# Patient Record
Sex: FEMALE | Race: WHITE | Employment: UNEMPLOYED | URBAN - METROPOLITAN AREA
[De-identification: names, ages, dates, MRNs, and addresses within clinical notes are randomized per-mention and may not be internally consistent; named-entity substitution may affect disease eponyms.]

---

## 2018-05-24 ENCOUNTER — OP HISTORICAL/CONVERTED ENCOUNTER (OUTPATIENT)
Dept: OTHER | Age: 48
End: 2018-05-24

## 2019-07-16 ENCOUNTER — OP HISTORICAL/CONVERTED ENCOUNTER (OUTPATIENT)
Dept: OTHER | Age: 49
End: 2019-07-16

## 2019-07-25 ENCOUNTER — OP HISTORICAL/CONVERTED ENCOUNTER (OUTPATIENT)
Dept: OTHER | Age: 49
End: 2019-07-25

## 2021-06-15 ENCOUNTER — OFFICE VISIT (OUTPATIENT)
Dept: OBGYN CLINIC | Age: 51
End: 2021-06-15
Payer: COMMERCIAL

## 2021-06-15 VITALS
OXYGEN SATURATION: 93 % | BODY MASS INDEX: 30.39 KG/M2 | WEIGHT: 178 LBS | HEIGHT: 64 IN | SYSTOLIC BLOOD PRESSURE: 142 MMHG | DIASTOLIC BLOOD PRESSURE: 98 MMHG | HEART RATE: 92 BPM | RESPIRATION RATE: 16 BRPM

## 2021-06-15 DIAGNOSIS — Z01.419 GYNECOLOGIC EXAM NORMAL: Primary | ICD-10-CM

## 2021-06-15 DIAGNOSIS — Z90.710 SCREENING FOR MALIGNANT NEOPLASM OF VAGINA AFTER TOTAL HYSTERECTOMY: ICD-10-CM

## 2021-06-15 DIAGNOSIS — Z11.3 ROUTINE SCREENING FOR STI (SEXUALLY TRANSMITTED INFECTION): ICD-10-CM

## 2021-06-15 DIAGNOSIS — Z12.72 SCREENING FOR MALIGNANT NEOPLASM OF VAGINA AFTER TOTAL HYSTERECTOMY: ICD-10-CM

## 2021-06-15 PROCEDURE — 99396 PREV VISIT EST AGE 40-64: CPT | Performed by: OBSTETRICS & GYNECOLOGY

## 2021-06-15 NOTE — PATIENT INSTRUCTIONS

## 2021-06-17 NOTE — PROGRESS NOTES
Dimitri Lockhart is a 45 y.o. female, , No LMP recorded. Patient has had a hysterectomy. , who presents today for the following:  Chief Complaint   Patient presents with    Annual Exam     Pt wants std testing. No Known Allergies    No current outpatient medications on file. No current facility-administered medications for this visit. History reviewed. No pertinent past medical history. Past Surgical History:   Procedure Laterality Date    HX BLADDER SUSPENSION         History reviewed. No pertinent family history. Social History     Socioeconomic History    Marital status:      Spouse name: Not on file    Number of children: Not on file    Years of education: Not on file    Highest education level: Not on file   Occupational History    Not on file   Tobacco Use    Smoking status: Current Every Day Smoker     Packs/day: 1.00    Smokeless tobacco: Never Used   Substance and Sexual Activity    Alcohol use: Yes    Drug use: Never    Sexual activity: Yes     Partners: Male     Birth control/protection: Surgical   Other Topics Concern    Not on file   Social History Narrative    Not on file     Social Determinants of Health     Financial Resource Strain:     Difficulty of Paying Living Expenses:    Food Insecurity:     Worried About Running Out of Food in the Last Year:     [address] in the Last Year:    Transportation Needs:     Lack of Transportation (Medical):      Lack of Transportation (Non-Medical):    Physical Activity:     Days of Exercise per Week:     Minutes of Exercise per Session:    Stress:     Feeling of Stress :    Social Connections:     Frequency of Communication with Friends and Family:     Frequency of Social Gatherings with Friends and Family:     Attends [employer] Services:     Active Member of Clubs or Organizations:     Attends Club or Organization Meetings:     Marital Status:    Intimate Partner Violence:     Fear of Current or Ex-Partner:     Emotionally Abused:     Physically Abused:     Sexually Abused:          HPI  Annual exam    Review of Systems   Constitutional: Negative. Respiratory: Negative. Cardiovascular: Negative. Gastrointestinal: Negative. Genitourinary: Negative. Musculoskeletal: Negative. Skin: Negative. Neurological: Negative. Endo/Heme/Allergies: Negative. Psychiatric/Behavioral: Negative. All other systems reviewed and are negative. BP (!) 142/98 (BP 1 Location: Right arm, BP Patient Position: Sitting)   Pulse 92   Resp 16   Ht 5' 4\" (1.626 m)   Wt 178 lb (80.7 kg)   SpO2 93%   BMI 30.55 kg/m²    OBGyn Exam   Constitutional:     General Appearance: healthy-appearing, well-nourished, and well-developed; Level of Distress: NAD. Ambulation: ambulating normally. Psychiatric:   Insight: good judgement. Mental Status: normal mood and affect and active and alert. Orientation: to time, place, and person. Memory: recent memory normal and remote memory normal.     Head: Head: normocephalic and atraumatic. Neck:   Neck: supple, FROM, trachea midline, and no masses. Lymph Nodes: no cervical LAD, supraclavicular LAD, axillary LAD, or inguinal LAD. Thyroid: no enlargement or nodules and non-tender. Lungs:   Respiratory effort: no dyspnea. Cardiovascular:     Pulses including femoral / pedal: normal throughout. Breast: Breast: no masses or abnormal secretions and normal appearance. Abdomen:    no tenderness, guarding, masses, rebound tenderness, or CVA tenderness and non-distended. Female :   External genitalia: no lesions or rash and normal.   Vagina: moist mucosa;    Cervix: absent   Uterus: absent  Adnexae: no adnexal mass or tenderness and size WNL. Bladder and Urethra: normal bladder and urethra (except where noted).        Skin:   Inspection and palpation: no rash, lesions, ulcer, induration, nodules, jaundice, or abnormal nevi and good turgor. Nails: normal.     Back: Thoracolumbar Appearance: normal curvature. 1. Gynecologic exam normal    - CBC WITH AUTOMATED DIFF; Future  - TSH AND FREE T4  - PAP IG, CT-NG-TV, APTIMA HPV AND RFX 16/18,45([id number])  - FSH AND LH  - PROGESTERONE  - ESTRADIOL, SERUM    2. Screening for malignant neoplasm of vagina after total hysterectomy      3.  Routine screening for STI (sexually transmitted infection)    - HSV1/HSV2(IGG/M)        Follow-up and Dispositions    · Return in about 1 year (around 6/15/2022) for annual.

## 2021-06-18 LAB
C TRACH RRNA CVX QL NAA+PROBE: NEGATIVE
CYTOLOGIST CVX/VAG CYTO: NORMAL
CYTOLOGY CVX/VAG DOC CYTO: NORMAL
CYTOLOGY CVX/VAG DOC THIN PREP: NORMAL
DX ICD CODE: NORMAL
HPV I/H RISK 4 DNA CVX QL PROBE+SIG AMP: NEGATIVE
Lab: NORMAL
N GONORRHOEA RRNA CVX QL NAA+PROBE: NEGATIVE
OTHER STN SPEC: NORMAL
STAT OF ADQ CVX/VAG CYTO-IMP: NORMAL
T VAGINALIS RRNA SPEC QL NAA+PROBE: NEGATIVE

## 2021-06-20 LAB
ESTRADIOL SERPL HS-MCNC: 41 PG/ML
FSH SERPL-ACNC: 24.3 MIU/ML
HSV1 IGG SER IA-ACNC: <0.91 INDEX (ref 0–0.9)
HSV1 IGM TITR SER IF: ABNORMAL TITER
HSV2 IGG SER IA-ACNC: 9.34 INDEX (ref 0–0.9)
HSV2 IGM TITR SER IF: ABNORMAL TITER
LH SERPL-ACNC: 9.1 MIU/ML
PROGEST SERPL-MCNC: 0.1 NG/ML

## 2021-07-02 ENCOUNTER — HOSPITAL ENCOUNTER (OUTPATIENT)
Dept: MAMMOGRAPHY | Age: 51
Discharge: HOME | End: 2021-07-02
Attending: OBSTETRICS & GYNECOLOGY
Payer: COMMERCIAL

## 2021-07-02 DIAGNOSIS — R92.8 ABNORMAL MAMMOGRAM: ICD-10-CM

## 2021-07-02 PROCEDURE — 77061 BREAST TOMOSYNTHESIS UNI: CPT

## 2021-12-13 ENCOUNTER — OFFICE VISIT (OUTPATIENT)
Dept: FAMILY MEDICINE CLINIC | Age: 51
End: 2021-12-13
Payer: COMMERCIAL

## 2021-12-13 VITALS
HEIGHT: 64 IN | WEIGHT: 183.6 LBS | DIASTOLIC BLOOD PRESSURE: 81 MMHG | BODY MASS INDEX: 31.34 KG/M2 | OXYGEN SATURATION: 98 % | TEMPERATURE: 97.3 F | SYSTOLIC BLOOD PRESSURE: 116 MMHG | HEART RATE: 96 BPM

## 2021-12-13 DIAGNOSIS — Z00.00 ENCOUNTER FOR ROUTINE ADULT HEALTH EXAMINATION WITHOUT ABNORMAL FINDINGS: Primary | ICD-10-CM

## 2021-12-13 DIAGNOSIS — Z11.59 NEED FOR HEPATITIS B SCREENING TEST: ICD-10-CM

## 2021-12-13 DIAGNOSIS — E66.3 OVERWEIGHT: ICD-10-CM

## 2021-12-13 DIAGNOSIS — Z11.59 ENCOUNTER FOR HEPATITIS C SCREENING TEST FOR LOW RISK PATIENT: ICD-10-CM

## 2021-12-13 PROCEDURE — 99386 PREV VISIT NEW AGE 40-64: CPT | Performed by: FAMILY MEDICINE

## 2021-12-13 NOTE — PROGRESS NOTES
IDENTIFYING INFORMATION:      Anais Kiran , 45 y.o., female  [facility]     Medical Record Number: [id number]    E and M CODING:  NEW PATIENT      There is no problem list on file for this patient. CHIEF COMPLAINT:   Chief Complaint   Patient presents with    New Patient     wants a physical.  States that she hasnt had one in years.  Hip Pain     c/o right hip pain         HISTORY OF PRESENT ILLNESS:    Rossy Rutherford is a 45 y.o. female . she comes in for New patient and says she wants a physical, has a gynecologist and PAP, mammo up to date and reviewed those records in chart. Had screening colonoscopy age 47 and due in . Says her ex  , liver caner and she wants checked. NO history or reported hepatitis and she has not checked. Also has some right hip pain, chronically and some with walking, usually when lying and a nagging pain when walking. Annoying but not severe, doesn't take medications. Does not give out, click or catch. Sitting a lot , works from home. Not as bad during those times. Occasionally gets vertigo, randomly. Not associated with anything. Will take a dramamine and lie down and goes away. Had it severely years ago. Left side and at night, lying. Had it worked up she says and now it is infrequent. Perhaps with change in weather, rainy . Does wear a mouthguard at night, clinches teeth and diagnosed with TMJ. PAST MEDICAL HISTORY:     History reviewed. No pertinent past medical history. MEDICATIONS:     No current outpatient medications on file prior to visit. No current facility-administered medications on file prior to visit.        ALLERGIES:    No Known Allergies      SOCIAL HISTORY:     Social History     Socioeconomic History    Marital status:    Tobacco Use    Smoking status: Current Every Day Smoker     Packs/day: 0.50     Types: Cigarettes    Smokeless tobacco: Never Used   Vaping Use    Vaping Use: Never used   Substance and Sexual Activity    Alcohol use: Yes     Comment: rarely    Drug use: Never    Sexual activity: Yes     Partners: Male     Birth control/protection: Surgical         SURGICAL HISTORY:    Past Surgical History:   Procedure Laterality Date    HX BLADDER SUSPENSION      HX COLONOSCOPY  2019    HX PARTIAL HYSTERECTOMY          FAMILY HISTORY:    Family History   Problem Relation Age of Onset    Cancer Mother         ureter cancer    Cancer Father         hepatoma    Cancer Maternal Aunt         breast         REVIEW OF SYSTEMS:    I personally collected this information from all available source present (patient/others in room and records available) -[email or web address]  Review of Systems   Constitutional: Negative for chills, diaphoresis and fever. HENT: Negative for congestion, hearing loss, sore throat and tinnitus. Eyes: Negative for blurred vision, double vision and photophobia. Respiratory: Negative for cough, sputum production, shortness of breath and wheezing. Cardiovascular: Negative for chest pain, palpitations, orthopnea, leg swelling and PND. Gastrointestinal: Positive for heartburn (occasionally). Negative for abdominal pain, blood in stool, constipation, diarrhea, nausea and vomiting. Genitourinary: Negative for dysuria, frequency and urgency. Musculoskeletal: Positive for joint pain. Negative for back pain, myalgias and neck pain. Skin: Negative for itching. Neurological: Positive for dizziness. Negative for tingling, sensory change, weakness and headaches. Endo/Heme/Allergies: Does not bruise/bleed easily. Psychiatric/Behavioral: Negative for depression. The patient is not nervous/anxious and does not have insomnia.           PHYSICAL EXAMINATION:    Vital Signs:    Visit Vitals  /81 (BP 1 Location: Right upper arm, BP Patient Position: Sitting)   Pulse 96   Temp 97.3 °F (36.3 °C) (Temporal)   Ht 5' 4\" (1.626 m)   Wt 183 lb 9.6 oz (83.3 kg) SpO2 98%   BMI 31.51 kg/m²         Wt Readings from Last 3 Encounters:   12/13/21 183 lb 9.6 oz (83.3 kg)   06/15/21 178 lb (80.7 kg)     BP Readings from Last 3 Encounters:   12/13/21 116/81   06/15/21 (!) 142/98         Physical Exam  Nursing note reviewed. Constitutional:       General: She is not in acute distress. Appearance: Normal appearance. She is obese. She is not ill-appearing or toxic-appearing. HENT:      Right Ear: Tympanic membrane, ear canal and external ear normal.      Left Ear: Tympanic membrane, ear canal and external ear normal.      Nose: No congestion or rhinorrhea. Mouth/Throat:      Pharynx: No oropharyngeal exudate or posterior oropharyngeal erythema. Eyes:      General: No scleral icterus. Extraocular Movements: Extraocular movements intact. Conjunctiva/sclera: Conjunctivae normal.      Pupils: Pupils are equal, round, and reactive to light. Neck:      Vascular: No carotid bruit. Cardiovascular:      Rate and Rhythm: Normal rate and regular rhythm. Heart sounds: No murmur heard. No friction rub. No gallop. Pulmonary:      Effort: Pulmonary effort is normal.      Breath sounds: Normal breath sounds. No wheezing, rhonchi or rales. Abdominal:      General: Bowel sounds are normal. There is no distension. Palpations: Abdomen is soft. There is no mass. Tenderness: There is no abdominal tenderness. Musculoskeletal:         General: Tenderness (right SI, normal ROM hip and NO trchanter tenderness ) present. No swelling or deformity. Cervical back: No rigidity. No muscular tenderness. Right lower leg: No edema. Left lower leg: No edema. Lymphadenopathy:      Cervical: No cervical adenopathy. Skin:     Coloration: Skin is not jaundiced. Findings: No erythema or rash. Neurological:      General: No focal deficit present. Mental Status: She is alert and oriented to person, place, and time.  Mental status is at baseline. Gait: Gait normal.   Psychiatric:         Mood and Affect: Mood normal.         Behavior: Behavior normal.         Thought Content: Thought content normal.         Judgment: Judgment normal.           ASSESSMENT/PLAN:     New patient visit today  The patient and I discussed all age and gender appropriate screening exams. Risk of non compliance discussed including missing early, treatable and possibly curable serious health issues. Labs, imaging and vaccinations as well as any other pertinent testing was ordered if appropriate.  Due to ex spouse with liver cancer of undetermined etiology check LFts and Hep B and C , though she is otherwise low risk with no tattoos, blood transfusions .  Follow up and treat as indicated. ICD-10-CM ICD-9-CM    1. Encounter for routine adult health examination without abnormal findings  T55.08 Z92.8 METABOLIC PANEL, COMPREHENSIVE      CBC WITH AUTOMATED DIFF      LIPID PANEL      URINALYSIS W/ RFLX MICROSCOPIC   2. Need for hepatitis B screening test  Z11.59 V73.89 HBV CORE AB, IGG/IGM   3. Encounter for hepatitis C screening test for low risk patient  Z11.59 V73.89 HEPATITIS C QT BY PCR WITH REFLEX GENOTYPE   4. Overweight  E66.3 278.02 TSH 3RD GENERATION     Discussion (regarding today's visit with Golden Coleman);   WE gave the patient a review of medications, treatment, testing such as labs, imagine, referrals and when to call regarding results and appointments.  Reminded patient to keep any and all appointments with specialists, labs, imaging.  Reminded patient to make sure we get copies of any specialists care, labs and imaging.  Reminded patient to call of come by the office if there are any concerns, questions , comments or problems.  The patient verbalized understanding of the care plan and all questions were answered to the patient's satisfaction prior to leaving the office.     The patient was told that failure to comply with recommended testing could result in abnormal health consequences.  The patient was instructed to have yearly routine health maintenance including but not limited to age appropriate vaccines, testing, screening exams.  ALL questions were answered to her satisfaction before leaving the office. The patient actively participated in medical decision making. This date I spent  20 minutes reviewing chart, previous notes, tests and interviewing and examining patients answering questions providing follow up as well as ordering tests. FOLLOW UP:     Patient knows to keep any and all future visits scheduled unless told otherwise. Patient knows to call, come back if any concerns, questions, comments or problems arise. Follow-up and Dispositions    · Return in about 1 year (around 12/13/2022) for follow up yearly. Signed By: Nilson Reese DO     December 13, 2021     TIME: 6:06 pm    This visit was reviewed and signed electronically. It was been completed with voice recognition software and hand typing. It may have syntax and spelling errors despite editing.

## 2021-12-13 NOTE — PATIENT INSTRUCTIONS
General Health and concerns:  HEART HEALTHY DIET:  A heart healthy diet is one that is low in cholesterol (less than 300 mg daily), fat (less than 80 g daily) . You should also minimize carbohydrates / sugars (less amounts of breads, pastas, potato and potato products and sugary foods/snacks, cookies, cakes, etc) . Try to eat whole wheat/multigrain breads and pastas and eat more vegetables. Cook with olive oil (or no oil) and grill, bake, broil or boil foods. Less red meat and more chicken , fish and lean cuts of beef (limited). 2188-9205 calories per day is sufficient [id number] is acceptable for weight loss. EXERCISE:  You should do exercise 3-5 days per week (minimum) to include increasing your heart rate for 30 to 45 minutes. At least a pace of a brisk walk should do that. This build up your heart and lung endurance and muscles and helps many function of the body. OTHER:    IF your condition(s) do not improve, get worse and/or if any concerns arise, please call or come by the office. Routine Health maintenance: You need to get a yearly follow up/physical exam to review, discuss age and gender appropriate exams, labs, vaccines and screening tests. This includes cardiovascular health risk, cancer screens and other siomara related topics. Medications-Take all medications as directed. Please do not stop unless you talk to your doctor or health care provider first. Report any problems immediately. PLEASE CHECK THE LIST FROM TODAY's VISIT and make SURE IT IS ACCURATE-Please bring any issues to our concern IMMEDIATELY!! Referrals: if you have been given a referral, please call the office if you do not hear from provider in one week. You may make the appointment yourself. Please keep all appointments with specialists and ask them to send their notes, thoughts, recommendations to us , as your PCP.     KEEP all upcoming appointments with our office UNLESS otherwise and specifically told not to. CHECK your diagnosis/problem list for today and that orders and prescriptions are what we discussed as well as MAKE sure all information is accurate and has been discussed to your satisfaction. PLEASE make sure all your questions have been answered and feel free to call or come back should any concerns arise. Imaging/Labs:  Be sure to get these images in a timely manner. IF your test must be scheduled, let us know if you need help getting this done and if you do not hear from that provider in a week , call us or them. BE SURE to call the office if you do not hear regarding the results in one week after the test is performed Image or lab). It is our intention to inform you of the results ALWAYS, even if normal you should get a notification (Call, portal message). PLEASE abdirahman if you do not get the results. PLEASE follow all recommendations and call/come in /ask questions if you do not understand of if problems develop after or in between visits. Failure to comply with recommended health care advise could result in serious health consequences. Thank you for choosing our practice and please let us know how we can help you feel better and stay well!

## 2021-12-13 NOTE — PROGRESS NOTES
Chief Complaint   Patient presents with    New Patient     wants a physical.  States that she hasnt had one in years.      Hip Pain     c/o right hip pain         Visit Vitals  /81 (BP 1 Location: Right upper arm, BP Patient Position: Sitting)   Pulse 96   Temp 97.3 °F (36.3 °C) (Temporal)   Ht 5' 4\" (1.626 m)   Wt 183 lb 9.6 oz (83.3 kg)   SpO2 98%   BMI 31.51 kg/m²

## 2021-12-15 LAB
ALBUMIN SERPL-MCNC: 4.6 G/DL (ref 3.8–4.9)
ALBUMIN/GLOB SERPL: 2 {RATIO} (ref 1.2–2.2)
ALP SERPL-CCNC: 83 IU/L (ref 44–121)
ALT SERPL-CCNC: 15 IU/L (ref 0–32)
APPEARANCE UR: CLEAR
AST SERPL-CCNC: 12 IU/L (ref 0–40)
BASOPHILS # BLD AUTO: 0.1 X10E3/UL (ref 0–0.2)
BASOPHILS NFR BLD AUTO: 1 %
BILIRUB SERPL-MCNC: 0.4 MG/DL (ref 0–1.2)
BILIRUB UR QL STRIP: NEGATIVE
BUN SERPL-MCNC: 11 MG/DL (ref 6–24)
BUN/CREAT SERPL: 14 (ref 9–23)
CALCIUM SERPL-MCNC: 9.3 MG/DL (ref 8.7–10.2)
CHLORIDE SERPL-SCNC: 103 MMOL/L (ref 96–106)
CHOLEST SERPL-MCNC: 202 MG/DL (ref 100–199)
CO2 SERPL-SCNC: 22 MMOL/L (ref 20–29)
COLOR UR: YELLOW
CREAT SERPL-MCNC: 0.78 MG/DL (ref 0.57–1)
EOSINOPHIL # BLD AUTO: 0.4 X10E3/UL (ref 0–0.4)
EOSINOPHIL NFR BLD AUTO: 4 %
ERYTHROCYTE [DISTWIDTH] IN BLOOD BY AUTOMATED COUNT: 12.3 % (ref 11.7–15.4)
GLOBULIN SER CALC-MCNC: 2.3 G/DL (ref 1.5–4.5)
GLUCOSE SERPL-MCNC: 96 MG/DL (ref 65–99)
GLUCOSE UR QL: NEGATIVE
HBV CORE AB SERPL QL IA: NEGATIVE
HBV CORE IGM SERPL QL IA: NEGATIVE
HCT VFR BLD AUTO: 44.4 % (ref 34–46.6)
HCV GENOTYPE: NORMAL
HCV RNA SERPL NAA+PROBE-ACNC: NORMAL IU/ML
HCV RNA SERPL NAA+PROBE-LOG IU: NORMAL LOG10 IU/ML
HDLC SERPL-MCNC: 36 MG/DL
HGB BLD-MCNC: 14.7 G/DL (ref 11.1–15.9)
HGB UR QL STRIP: NEGATIVE
IMM GRANULOCYTES # BLD AUTO: 0.1 X10E3/UL (ref 0–0.1)
IMM GRANULOCYTES NFR BLD AUTO: 1 %
KETONES UR QL STRIP: NEGATIVE
LDLC SERPL CALC-MCNC: 130 MG/DL (ref 0–99)
LEUKOCYTE ESTERASE UR QL STRIP: NEGATIVE
LYMPHOCYTES # BLD AUTO: 2.2 X10E3/UL (ref 0.7–3.1)
LYMPHOCYTES NFR BLD AUTO: 28 %
MCH RBC QN AUTO: 28.9 PG (ref 26.6–33)
MCHC RBC AUTO-ENTMCNC: 33.1 G/DL (ref 31.5–35.7)
MCV RBC AUTO: 87 FL (ref 79–97)
MICRO URNS: NORMAL
MONOCYTES # BLD AUTO: 0.5 X10E3/UL (ref 0.1–0.9)
MONOCYTES NFR BLD AUTO: 6 %
NEUTROPHILS # BLD AUTO: 4.9 X10E3/UL (ref 1.4–7)
NEUTROPHILS NFR BLD AUTO: 60 %
NITRITE UR QL STRIP: NEGATIVE
PH UR STRIP: 5.5 [PH] (ref 5–7.5)
PLATELET # BLD AUTO: 286 X10E3/UL (ref 150–450)
POTASSIUM SERPL-SCNC: 4.5 MMOL/L (ref 3.5–5.2)
PROT SERPL-MCNC: 6.9 G/DL (ref 6–8.5)
PROT UR QL STRIP: NEGATIVE
RBC # BLD AUTO: 5.09 X10E6/UL (ref 3.77–5.28)
SODIUM SERPL-SCNC: 138 MMOL/L (ref 134–144)
SP GR UR: 1.02 (ref 1–1.03)
TEST INFORMATION: NORMAL
TRIGL SERPL-MCNC: 201 MG/DL (ref 0–149)
TSH SERPL DL<=0.005 MIU/L-ACNC: 1.69 UIU/ML (ref 0.45–4.5)
UROBILINOGEN UR STRIP-MCNC: 0.2 MG/DL (ref 0.2–1)
VLDLC SERPL CALC-MCNC: 36 MG/DL (ref 5–40)
WBC # BLD AUTO: 8 X10E3/UL (ref 3.4–10.8)

## 2021-12-20 NOTE — PROGRESS NOTES
The hepatitis is negative. Liver, kidneys, sugar normal.  Blood count is normal without anemia or infection. Thyroid is normal.  Urine is normal.  Cholesterol is elevated. The LDL is 130 and should be less than 100. The total is 202 and should be less than 200. I recommend taking red yeast rice 600 mg over-the-counter daily and rechecking in 6 months.

## 2023-03-02 ENCOUNTER — APPOINTMENT (OUTPATIENT)
Dept: CT IMAGING | Age: 53
End: 2023-03-02
Attending: EMERGENCY MEDICINE
Payer: COMMERCIAL

## 2023-03-02 ENCOUNTER — APPOINTMENT (OUTPATIENT)
Dept: GENERAL RADIOLOGY | Age: 53
End: 2023-03-02
Attending: EMERGENCY MEDICINE
Payer: COMMERCIAL

## 2023-03-02 ENCOUNTER — HOSPITAL ENCOUNTER (EMERGENCY)
Age: 53
Discharge: HOME | End: 2023-03-02
Attending: EMERGENCY MEDICINE
Payer: COMMERCIAL

## 2023-03-02 VITALS
TEMPERATURE: 98.2 F | HEIGHT: 64 IN | BODY MASS INDEX: 31.96 KG/M2 | WEIGHT: 187.2 LBS | SYSTOLIC BLOOD PRESSURE: 119 MMHG | DIASTOLIC BLOOD PRESSURE: 81 MMHG | HEART RATE: 93 BPM | RESPIRATION RATE: 14 BRPM | OXYGEN SATURATION: 97 %

## 2023-03-02 DIAGNOSIS — R42 VERTIGO: Primary | ICD-10-CM

## 2023-03-02 DIAGNOSIS — R07.89 ATYPICAL CHEST PAIN: ICD-10-CM

## 2023-03-02 LAB
ALBUMIN SERPL-MCNC: 4.1 G/DL (ref 3.5–5.2)
ALBUMIN/GLOB SERPL: 1.4 (ref 1.1–2.2)
ALP SERPL-CCNC: 95 U/L (ref 35–104)
ALT SERPL-CCNC: 33 U/L (ref 10–35)
ANION GAP SERPL CALC-SCNC: 11 MMOL/L (ref 5–15)
AST SERPL-CCNC: 20 U/L (ref 10–35)
BASOPHILS # BLD: 0.1 K/UL (ref 0–1)
BASOPHILS NFR BLD: 1 % (ref 0–1)
BILIRUB SERPL-MCNC: 0.3 MG/DL (ref 0.2–1)
BNP SERPL-MCNC: 30 PG/ML
BUN SERPL-MCNC: 11 MG/DL (ref 6–20)
BUN/CREAT SERPL: 15 (ref 12–20)
CALCIUM SERPL-MCNC: 9.2 MG/DL (ref 8.6–10)
CHLORIDE SERPL-SCNC: 107 MMOL/L (ref 98–107)
CO2 SERPL-SCNC: 24 MMOL/L (ref 22–29)
COMMENT, HOLDF: NORMAL
CREAT SERPL-MCNC: 0.74 MG/DL (ref 0.5–0.9)
D DIMER PPP FEU-MCNC: 0.28 UG/ML(FEU)
DIFFERENTIAL METHOD BLD: ABNORMAL
EOSINOPHIL # BLD: 0.5 K/UL (ref 0–0.4)
EOSINOPHIL NFR BLD: 6 %
ERYTHROCYTE [DISTWIDTH] IN BLOOD BY AUTOMATED COUNT: 12.7 % (ref 11.5–14.5)
GLOBULIN SER CALC-MCNC: 2.9 G/DL (ref 2–4)
GLUCOSE SERPL-MCNC: 119 MG/DL (ref 65–100)
HCT VFR BLD AUTO: 43.5 % (ref 35–47)
HGB BLD-MCNC: 14.5 G/DL (ref 11.5–16)
IMM GRANULOCYTES # BLD AUTO: 0.1 K/UL (ref 0–0.04)
IMM GRANULOCYTES NFR BLD AUTO: 1 % (ref 0–0.5)
LYMPHOCYTES # BLD: 1.8 K/UL (ref 0.8–3.5)
LYMPHOCYTES NFR BLD: 21 % (ref 12–49)
MAGNESIUM SERPL-MCNC: 1.9 MG/DL (ref 1.6–2.6)
MCH RBC QN AUTO: 28.7 PG (ref 26–34)
MCHC RBC AUTO-ENTMCNC: 33.3 G/DL (ref 30–36.5)
MCV RBC AUTO: 86.1 FL (ref 80–99)
MONOCYTES # BLD: 0.5 K/UL (ref 0–1)
MONOCYTES NFR BLD: 6 % (ref 5–13)
NEUTS SEG # BLD: 5.7 K/UL (ref 1.8–8)
NEUTS SEG NFR BLD: 65 % (ref 32–75)
NRBC # BLD: 0 K/UL (ref 0–0.01)
NRBC BLD-RTO: 0 PER 100 WBC
PHOSPHATE SERPL-MCNC: 2.9 MG/DL (ref 2.5–4.5)
PLATELET # BLD AUTO: 302 K/UL (ref 150–400)
PMV BLD AUTO: 8.7 FL (ref 8.9–12.9)
POTASSIUM SERPL-SCNC: 4.1 MMOL/L (ref 3.5–5.1)
PROT SERPL-MCNC: 7 G/DL (ref 6.4–8.3)
RBC # BLD AUTO: 5.05 M/UL (ref 3.8–5.2)
SAMPLES BEING HELD,HOLD: NORMAL
SODIUM SERPL-SCNC: 142 MMOL/L (ref 136–145)
TROPONIN I BLD-MCNC: <0.04 NG/ML (ref 0–0.08)
WBC # BLD AUTO: 8.6 K/UL (ref 3.6–11)

## 2023-03-02 PROCEDURE — 74011250636 HC RX REV CODE- 250/636: Performed by: EMERGENCY MEDICINE

## 2023-03-02 PROCEDURE — 70450 CT HEAD/BRAIN W/O DYE: CPT

## 2023-03-02 PROCEDURE — 85025 COMPLETE CBC W/AUTO DIFF WBC: CPT

## 2023-03-02 PROCEDURE — 85379 FIBRIN DEGRADATION QUANT: CPT

## 2023-03-02 PROCEDURE — 99285 EMERGENCY DEPT VISIT HI MDM: CPT

## 2023-03-02 PROCEDURE — 84100 ASSAY OF PHOSPHORUS: CPT

## 2023-03-02 PROCEDURE — 83880 ASSAY OF NATRIURETIC PEPTIDE: CPT

## 2023-03-02 PROCEDURE — 71045 X-RAY EXAM CHEST 1 VIEW: CPT

## 2023-03-02 PROCEDURE — 93005 ELECTROCARDIOGRAM TRACING: CPT

## 2023-03-02 PROCEDURE — 80053 COMPREHEN METABOLIC PANEL: CPT

## 2023-03-02 PROCEDURE — 96372 THER/PROPH/DIAG INJ SC/IM: CPT

## 2023-03-02 PROCEDURE — 96374 THER/PROPH/DIAG INJ IV PUSH: CPT

## 2023-03-02 PROCEDURE — 83735 ASSAY OF MAGNESIUM: CPT

## 2023-03-02 PROCEDURE — 36415 COLL VENOUS BLD VENIPUNCTURE: CPT

## 2023-03-02 RX ORDER — MECLIZINE HYDROCHLORIDE 25 MG/1
25 TABLET ORAL
Qty: 60 TABLET | Refills: 0 | Status: SHIPPED | OUTPATIENT
Start: 2023-03-02

## 2023-03-02 RX ORDER — DIAZEPAM 10 MG/2ML
5 INJECTION INTRAMUSCULAR
Status: COMPLETED | OUTPATIENT
Start: 2023-03-02 | End: 2023-03-02

## 2023-03-02 RX ORDER — ONDANSETRON 4 MG/1
4 TABLET, ORALLY DISINTEGRATING ORAL
Qty: 15 TABLET | Refills: 0 | Status: SHIPPED | OUTPATIENT
Start: 2023-03-02

## 2023-03-02 RX ORDER — ONDANSETRON 2 MG/ML
8 INJECTION INTRAMUSCULAR; INTRAVENOUS
Status: COMPLETED | OUTPATIENT
Start: 2023-03-02 | End: 2023-03-02

## 2023-03-02 RX ADMIN — ONDANSETRON 8 MG: 2 INJECTION INTRAMUSCULAR; INTRAVENOUS at 06:09

## 2023-03-02 RX ADMIN — DIAZEPAM 5 MG: 5 INJECTION, SOLUTION INTRAMUSCULAR; INTRAVENOUS at 06:31

## 2023-03-02 NOTE — ED PROVIDER NOTES
58-year-old female with a past medical history significant for bladder suspension, colonoscopy, partial hysterectomy, vertigo who presents to the ER with a complaint of dizziness that began approximately 4 to 5 days ago, positional, worse when laying down or turning to her left side accompanied by nausea and diaphoresis. The patient also complains of chest discomfort that she describes a pressure that began approximately 2 hours prior to arrival to the ER. The patient drove herself to the ER for further evaluation. The patient states that her dizziness is similar to prior episode of vertigo however she has not had this symptom for the past 2 years. She also complains of dull headache. .  She presently states that she is recovering from a URI symptoms that she has had over the past week and tested negative for COVID and influenza performed by her PCP. She denies any vomiting, blurry vision, earaches, neck and back pain, abdominal pain, diarrhea, constipation, dysuria, vaginal discharge or bleeding, extremity weakness or numbness, skin rash or recent travel. No past medical history on file.     Past Surgical History:   Procedure Laterality Date    HX BLADDER SUSPENSION      HX COLONOSCOPY  2019    HX PARTIAL HYSTERECTOMY           Family History:   Problem Relation Age of Onset    Cancer Mother         ureter cancer    Cancer Father         hepatoma    Cancer Maternal Aunt         breast       Social History     Socioeconomic History    Marital status:      Spouse name: Not on file    Number of children: Not on file    Years of education: Not on file    Highest education level: Not on file   Occupational History    Not on file   Tobacco Use    Smoking status: Every Day     Packs/day: 0.50     Types: Cigarettes    Smokeless tobacco: Never   Vaping Use    Vaping Use: Never used   Substance and Sexual Activity    Alcohol use: Yes     Comment: rarely    Drug use: Never    Sexual activity: Yes     Partners: Male     Birth control/protection: Surgical   Other Topics Concern    Not on file   Social History Narrative    Not on file     Social Determinants of Health     Financial Resource Strain: Not on file   Food Insecurity: Not on file   Transportation Needs: Not on file   Physical Activity: Not on file   Stress: Not on file   Social Connections: Not on file   Intimate Partner Violence: Not on file   Housing Stability: Not on file         ALLERGIES: Patient has no known allergies. Review of Systems   All other systems reviewed and are negative. Vitals:    03/02/23 0559   BP: (!) 150/111   Pulse: (!) 103   Resp: 12   Temp: 98.1 °F (36.7 °C)   SpO2: 96%   Weight: 84.9 kg (187 lb 3.2 oz)   Height: 5' 4\" (1.626 m)            Physical Exam  Vitals and nursing note reviewed. CONSTITUTIONAL: Well-appearing; well-nourished; in no apparent distress  HEAD: Normocephalic; atraumatic  EYES: PERRL; EOM intact; conjunctiva and sclera are clear bilaterally. ENT: No rhinorrhea; normal pharynx with no tonsillar hypertrophy; mucous membranes pink/moist, no erythema, no exudate. NECK: Supple; non-tender; no cervical lymphadenopathy  CARD: Normal S1, S2; no murmurs, rubs, or gallops. Regular rate and rhythm. RESP: Normal respiratory effort; breath sounds clear and equal bilaterally; no wheezes, rhonchi, or rales. ABD: Normal bowel sounds; non-distended; non-tender; no palpable organomegaly, no masses, no bruits. Back Exam: Normal inspection; no vertebral point tenderness, no CVA tenderness. Normal range of motion. EXT: Normal ROM in all four extremities; non-tender to palpation; no swelling or deformity; distal pulses are normal, no edema. SKIN: Warm; dry; no rash.   NEURO:Alert and oriented x 3, coherent, BRIAN-XII grossly intact, no nystagmus; no pronator drift; reflexes intact; finger-to-nose intact; sensory and motor are non-focal.        Medical Decision Making  Assessment: 53-year-old female who presents to the ER for evaluation for dizziness with history of vertigo, nausea and chest pressure. The patient is required for URI symptoms and appears slightly tachycardic but hemodynamically stable. The patient is adamant that dizziness is consistent with previous episodes of vertigo. However she stated headache is different. She remained hemodynamically stable with no focal neurologic findings on exam.  She will need evaluation for ACS, VTE including pneumonia, pleurisy, ICH as well as symptomatic treatment for vertigo. Plan: CT scan of the head/lab/EKG/chest x-ray/Valium/Zofran/serial exam/education, reassurance, symptomatic treatment/ Monitor and Reevaluate. Amount and/or Complexity of Data Reviewed  External Data Reviewed: labs, radiology, ECG and notes. Labs: ordered. Decision-making details documented in ED Course. Radiology: ordered and independent interpretation performed. Decision-making details documented in ED Course. ECG/medicine tests: ordered and independent interpretation performed. Decision-making details documented in ED Course. Risk  Prescription drug management. Procedures    ED EKG interpretation:  Rhythm: sinus tachycardia; and regular . Rate (approx.): 109; Axis: normal; P wave: normal; QRS interval: normal ; ST/T wave: non-specific changes; in  Lead: Diffusely; Other findings: borderline ekg. This EKG was interpreted by Markie Bell MD,ED Provider. XRAY INTERPRETATION (ED MD)  Chest Xray  No acute process seen. Normal heart size. No bony abnormalities. No infiltrate. Jann Haddad MD 6:42 AM     Progress Note:   Pt has been reexamined by Markie Bell MD. Pt is feeling much better. Symptoms have improved. She denies any further discomfort. She had extensive work-up without any significant findings. She was given p.o. challenge that she tolerated well. She wants to go home. All available results have been reviewed with pt and any available family.  Pt understands sx, dx, and tx in ED. Care plan has been outlined and questions have been answered. Pt is ready to go home. Will send home on vertigo and chest pain instruction. .  Prescription of meclizine and Zofran. Outpatient referral with PCP as needed. Written by Carlene Pandey MD,7:36 AM    .   .

## 2023-03-02 NOTE — Clinical Note
[address]  [facility] EMERGENCY DEPT  [address] [id number]  [phone]    Work/School Note    Date: 3/2/2023    To Whom It May concern:    Vin Denis was seen and treated today in the emergency room by the following provider(s):  Attending Provider: Scott Jimenes MD.      Vin Denis is excused from work/school on 03/02/23 and 03/03/23. She is medically clear to return to work/school on 3/4/2023.        Sincerely,          Dick Kendrick MD

## 2023-03-02 NOTE — ED NOTES
Verbal shift change report given to [facility] (oncoming nurse) by Matt Trevizo RN (offgoing nurse). Report included the following information SBAR, Kardex, ED Summary, [facility] and Recent Results.

## 2023-03-02 NOTE — ED TRIAGE NOTES
Pt presents with dizziness, nausea, and chest pressure. States dizziness began on Sunday but has worsen this morning. States she has a hx of vertigo with tilt table treatment last summer which helped with the right side. She states this episode feels like vertigo. PTA she stated she took motion sickness meds.

## 2023-03-04 LAB
ATRIAL RATE: 109 BPM
CALCULATED P AXIS, ECG09: 48 DEGREES
CALCULATED R AXIS, ECG10: 29 DEGREES
CALCULATED T AXIS, ECG11: 33 DEGREES
DIAGNOSIS, 93000: NORMAL
P-R INTERVAL, ECG05: 130 MS
Q-T INTERVAL, ECG07: 344 MS
QRS DURATION, ECG06: 78 MS
QTC CALCULATION (BEZET), ECG08: 463 MS
VENTRICULAR RATE, ECG03: 109 BPM

## 2023-05-20 RX ORDER — MECLIZINE HYDROCHLORIDE 25 MG/1
25 TABLET ORAL 3 TIMES DAILY PRN
COMMUNITY
Start: 2023-03-02

## 2023-05-20 RX ORDER — ONDANSETRON 4 MG/1
4 TABLET, ORALLY DISINTEGRATING ORAL EVERY 8 HOURS PRN
COMMUNITY
Start: 2023-03-02